# Patient Record
Sex: FEMALE | Race: WHITE | URBAN - METROPOLITAN AREA
[De-identification: names, ages, dates, MRNs, and addresses within clinical notes are randomized per-mention and may not be internally consistent; named-entity substitution may affect disease eponyms.]

---

## 2018-10-05 ENCOUNTER — APPOINTMENT (RX ONLY)
Dept: URBAN - METROPOLITAN AREA CLINIC 170 | Facility: CLINIC | Age: 53
Setting detail: DERMATOLOGY
End: 2018-10-05

## 2018-10-05 DIAGNOSIS — D22 MELANOCYTIC NEVI: ICD-10-CM

## 2018-10-05 PROBLEM — D48.5 NEOPLASM OF UNCERTAIN BEHAVIOR OF SKIN: Status: ACTIVE | Noted: 2018-10-05

## 2018-10-05 PROBLEM — E78.5 HYPERLIPIDEMIA, UNSPECIFIED: Status: ACTIVE | Noted: 2018-10-05

## 2018-10-05 PROCEDURE — ? ADDITIONAL NOTES

## 2018-10-05 PROCEDURE — ? DEFER

## 2018-10-05 PROCEDURE — 99201: CPT

## 2018-10-05 ASSESSMENT — LOCATION DETAILED DESCRIPTION DERM: LOCATION DETAILED: RIGHT INFERIOR MUCOSAL LIP

## 2018-10-05 ASSESSMENT — LOCATION ZONE DERM: LOCATION ZONE: LIP

## 2018-10-05 ASSESSMENT — LOCATION SIMPLE DESCRIPTION DERM: LOCATION SIMPLE: RIGHT INFERIOR MUCOSAL LIP

## 2018-10-05 NOTE — PROCEDURE: DEFER
Procedure To Be Performed At Next Visit: Biopsy by shave method
Instructions (Optional): Patient to have biopsy performed by Dr. Coretta Alfaro
Detail Level: Detailed

## 2018-10-05 NOTE — PROCEDURE: ADDITIONAL NOTES
Detail Level: Simple
Additional Notes: Photo taken. Recommended biopsy with Dr. Alfaro due to location of lesion. All questions answered patient is scheduled for Biopsy on 10/18/2018.

## 2018-10-18 ENCOUNTER — APPOINTMENT (RX ONLY)
Dept: URBAN - METROPOLITAN AREA CLINIC 170 | Facility: CLINIC | Age: 53
Setting detail: DERMATOLOGY
End: 2018-10-18

## 2018-10-18 DIAGNOSIS — D22 MELANOCYTIC NEVI: ICD-10-CM

## 2018-10-18 PROBLEM — D48.5 NEOPLASM OF UNCERTAIN BEHAVIOR OF SKIN: Status: ACTIVE | Noted: 2018-10-18

## 2018-10-18 PROCEDURE — 40490 BIOPSY OF LIP: CPT

## 2018-10-18 PROCEDURE — ? BIOPSY BY SHAVE METHOD

## 2018-10-18 ASSESSMENT — LOCATION DETAILED DESCRIPTION DERM: LOCATION DETAILED: RIGHT LOWER LIP WET VERMILLION

## 2018-10-18 ASSESSMENT — LOCATION SIMPLE DESCRIPTION DERM: LOCATION SIMPLE: RIGHT LOWER LIP

## 2018-10-18 ASSESSMENT — LOCATION ZONE DERM: LOCATION ZONE: LIP

## 2018-10-18 NOTE — PROCEDURE: BIOPSY BY SHAVE METHOD
Hemostasis: Electrodesiccation and Aluminum Chloride
Lab: [id number]
Anesthesia Type: 2% lidocaine with epinephrine
Lab Facility: [id number]
Bill For Surgical Tray: no
Detail Level: Detailed
Depth Of Biopsy: dermis
Biopsy Type: H and E
Additional Anesthesia Volume In Cc (Will Not Render If 0): 0
Anticipated Plan (Based On Presumed Biopsy Results): Mohs vs oral surgeon if MM
Cryotherapy Text: The wound bed was treated with cryotherapy after the biopsy was performed.
Post-Care Instructions: I reviewed with the patient in detail post-care instructions. Patient is to keep the biopsy site dry overnight, and then apply bacitracin twice daily until healed. Patient may apply hydrogen peroxide soaks to remove any crusting.
Notification Instructions: Patient will be notified of biopsy results. However, patient instructed to call the office if not contacted within 2 weeks.
Silver Nitrate Text: The wound bed was treated with silver nitrate after the biopsy was performed.
X Size Of Lesion In Cm: 0.4
Consent: Written consent was obtained and risks were reviewed including but not limited to scarring, infection, bleeding, scabbing, incomplete removal, nerve damage and allergy to anesthesia.
Type Of Destruction Used: Curettage
Anesthesia Volume In Cc (Will Not Render If 0): 1
Electrodesiccation And Curettage Text: The wound bed was treated with electrodesiccation and curettage after the biopsy was performed.
Electrodesiccation Text: The wound bed was treated with electrodesiccation after the biopsy was performed.
Billing Type: Third-Party Bill
Curettage Text: The wound bed was treated with curettage after the biopsy was performed.
Wound Care: Petrolatum
Size Of Lesion In Cm: 0.3
Was A Bandage Applied: Yes
Biopsy Method: double edge Personna blade
Dressing: Band-Aid

## 2018-10-18 NOTE — HPI: SKIN LESION
How Severe Is Your Skin Lesion?: mild
Has Your Skin Lesion Been Treated?: not been treated
Is This A New Presentation, Or A Follow-Up?: Follow Up Skin Lesion
Is This A New Presentation, Or A Follow-Up?: Growth